# Patient Record
Sex: MALE | Race: WHITE | Employment: OTHER | URBAN - METROPOLITAN AREA
[De-identification: names, ages, dates, MRNs, and addresses within clinical notes are randomized per-mention and may not be internally consistent; named-entity substitution may affect disease eponyms.]

---

## 2020-01-13 ENCOUNTER — TELEPHONE (OUTPATIENT)
Dept: PULMONOLOGY | Facility: CLINIC | Age: 80
End: 2020-01-13

## 2020-01-13 NOTE — TELEPHONE ENCOUNTER
Evelyn Del Castillo states she spoke with Dr Zayra Vargas and was informed to call office to have patient's consult appointment moved up sooner. Please call. Thank you.

## 2020-01-14 NOTE — TELEPHONE ENCOUNTER
Patients daughter in law/Judy calling for appointment sooner than 4/16/2020, indicates Jah Rodriguez called her two days ago and approved appointment for this month in January. Please call at:[phone],thanks.

## 2020-01-14 NOTE — TELEPHONE ENCOUNTER
Disregard, found a new consult appointment with [facility] Thursday 1/16/2020 at 11:30 AM, informed to arrive . 15mins early, closing encounter, thanks.

## 2020-01-16 ENCOUNTER — TELEPHONE (OUTPATIENT)
Dept: PULMONOLOGY | Facility: CLINIC | Age: 80
End: 2020-01-16

## 2020-01-16 ENCOUNTER — OFFICE VISIT (OUTPATIENT)
Dept: PULMONOLOGY | Facility: CLINIC | Age: 80
End: 2020-01-16
Payer: MEDICARE

## 2020-01-16 VITALS
HEIGHT: 68 IN | HEART RATE: 78 BPM | OXYGEN SATURATION: 92 % | DIASTOLIC BLOOD PRESSURE: 70 MMHG | WEIGHT: 157 LBS | RESPIRATION RATE: 18 BRPM | SYSTOLIC BLOOD PRESSURE: 152 MMHG | BODY MASS INDEX: 23.79 KG/M2

## 2020-01-16 DIAGNOSIS — J84.10 PULMONARY FIBROSIS (HCC): Primary | ICD-10-CM

## 2020-01-16 PROCEDURE — G0463 HOSPITAL OUTPT CLINIC VISIT: HCPCS | Performed by: INTERNAL MEDICINE

## 2020-01-16 PROCEDURE — 99204 OFFICE O/P NEW MOD 45 MIN: CPT | Performed by: INTERNAL MEDICINE

## 2020-01-16 RX ORDER — PANTOPRAZOLE SODIUM 40 MG/1
40 TABLET, DELAYED RELEASE ORAL
COMMUNITY

## 2020-01-16 RX ORDER — BENZONATATE 100 MG/1
100 CAPSULE ORAL 3 TIMES DAILY PRN
Qty: 90 CAPSULE | Refills: 6 | Status: SHIPPED | OUTPATIENT
Start: 2020-01-16

## 2020-01-16 RX ORDER — OMEPRAZOLE 20 MG/1
20 CAPSULE, DELAYED RELEASE ORAL
COMMUNITY

## 2020-01-16 NOTE — PROGRESS NOTES
Dear Dr. Malgorzata Prince:           As you know, Mr. Chidi Burk is an 42-year-old male who I am now evaluating for second opinion regarding pulmonary fibrosis. HISTORY OF PRESENT ILLNESS: The patient is a lifetime non-smoker.   He had worked at the [employer] dry bibasilar crackles to auscultation and percussion. Cardiac regular rate and rhythm no murmur. Abdomen nontender, without hepatosplenomegaly and no mass appreciable. Extremities without clubbing cyanosis nor edema.  Neurologic grossly intact with symmetr

## 2020-01-16 NOTE — TELEPHONE ENCOUNTER
Daughter brought disc- CT abdomen/pelvis from [facility]. and PFT results- for Dr Ford Parra review- put in RN box-     Pt wants disc back- pl call when ready for

## 2020-01-21 ENCOUNTER — TELEPHONE (OUTPATIENT)
Dept: PULMONOLOGY | Facility: CLINIC | Age: 80
End: 2020-01-21

## 2020-01-21 DIAGNOSIS — J84.10 PULMONARY FIBROSIS (HCC): Primary | ICD-10-CM

## 2020-01-21 NOTE — TELEPHONE ENCOUNTER
Porsche Gonzales was wondering if Dr Prashant Lincoln had a chance to review CT Scan from [facility] and wanted to know what his thoughts were on with IPF. Please call. Thank you.

## 2020-01-29 NOTE — TELEPHONE ENCOUNTER
I spoke with the daughter-in-law regarding review of the CT scan of the abdomen. She was going to try to find a CT scan of the chest for my review. The abdominal films look like IPF.

## 2020-02-05 ENCOUNTER — TELEPHONE (OUTPATIENT)
Dept: PULMONOLOGY | Facility: CLINIC | Age: 80
End: 2020-02-05

## 2020-02-05 NOTE — TELEPHONE ENCOUNTER
Becka Garibay was wondering if Dr Idania Moulton had a chance to review CT Scan she has dropped off. Please call - aware Dr Idania Moulton is not in office. Thank you.

## 2020-02-05 NOTE — TELEPHONE ENCOUNTER
RN, please call [place] to let her know that I have received and reviewed the CAT scan and the findings are consistent with IPF.

## 2020-02-06 NOTE — TELEPHONE ENCOUNTER
I left a message stating my opinion that the appearance is consistent with IPF after reviewing the CAT scan.

## 2020-02-07 NOTE — TELEPHONE ENCOUNTER
Nicolasa Every informed of Dr. Sheree Manzo message below. Nicolasa Every would like to discuss with Dr. Sheree Manzo. Nicolasa Every would like to know if there is any other possible diagnoses. Nicolasa Every states pt did start Esbriet. Dr. Arpit Reyes, please call Nicolasa Every.

## 2020-02-10 NOTE — TELEPHONE ENCOUNTER
I spoke with Becka Garibay at telephone number [phone]. Again, I reiterated that I to strongly suspect the patient has IPF and agree with the empiric therapies as initiated.

## 2020-02-13 NOTE — TELEPHONE ENCOUNTER
2 copies of disc of CT Chest Hi-Resolution dated 12/24/19 from [facility] rcvd & brought to PACS w/ instructions to upload & mail back to pt thereafter per Dr. Blas Villela request.

## 2020-05-26 ENCOUNTER — TELEPHONE (OUTPATIENT)
Dept: PULMONOLOGY | Facility: CLINIC | Age: 80
End: 2020-05-26

## 2020-05-26 DIAGNOSIS — J84.10 PULMONARY FIBROSIS (HCC): Primary | ICD-10-CM

## 2020-05-26 NOTE — TELEPHONE ENCOUNTER
Pt's daughter states her dad is having test done at [facility] and wants advice from Dr Sonya Jean if these test are needed and if he would get the results before the appt on 6-2 at 2:30 pm. Please advise

## 2020-05-26 NOTE — TELEPHONE ENCOUNTER
Spoke with daughter Christophe Banda (ALONDRA on file) regarding message below. Daughter states primary pulmonologist at [facility] ordered CT chest, PFT, and thyroid lab test. Daughter states last CT chest was done 6 months ago and is currently on Esbriet.  Daughter

## 2020-05-26 NOTE — TELEPHONE ENCOUNTER
Daughter called office back again, inquiring if Dr. Samuel Hopkraig would order [employer] POC for patient, patient paying out of pocket. Advised daughter to call [employer] to see what documents are needed (O2 walk & office visit).  Daughter verbalized understanding and yeyo

## 2020-05-27 NOTE — TELEPHONE ENCOUNTER
Patients daughter/Judy calling to request to look out for a fax from [address] [place] [address] regarding approval needed for portable oxygen, please call at:[phone],thanks.

## 2020-05-27 NOTE — TELEPHONE ENCOUNTER
Daughter requesting order for portable oxygen concentrator. Patient using 2L of oxygen. Dr. Servando Narvaez to order [employer] Portable Oxygen Concentrator at pulse volume setting of 2 via nasal cannula? Patient has appointment scheduled on 6/2/20.

## 2020-05-28 ENCOUNTER — TELEPHONE (OUTPATIENT)
Dept: PULMONOLOGY | Facility: CLINIC | Age: 80
End: 2020-05-28

## 2020-05-28 NOTE — TELEPHONE ENCOUNTER
Incoming call from Fatmata Dotson at [place], confirmed order was received. Fatmata Dotson states order is under review and has been in contact with patient's daughter. POC form sent to HIM for scanning.

## 2020-05-28 NOTE — TELEPHONE ENCOUNTER
Received call from Mercy Hester at [place], states they will not accept our order. Form from [facility] placed in Dr. Kilo Alcantara folder to sign. Informed Mercy Hester once form is sign, will fax. Bessy verbalized understanding.

## 2020-05-28 NOTE — TELEPHONE ENCOUNTER
POC form faxed to [phone]. Fax confirmation received. Daughter informed of this and to follow up with [employer] regarding order. Daughter verbalized understanding.

## 2020-05-28 NOTE — TELEPHONE ENCOUNTER
Order faxed to [phone]. Fax confirmation received. Bessy from [place] informed of order.

## 2020-06-01 ENCOUNTER — TELEPHONE (OUTPATIENT)
Dept: PULMONOLOGY | Facility: CLINIC | Age: 80
End: 2020-06-01

## 2020-06-01 NOTE — TELEPHONE ENCOUNTER
Spoke with patient's daughter in law [place] (ALONDRA on file) regarding messasge below. [place] states patients appointment for tomorrow was rescheduled for 7/20/20 (due to covid meeting for MD).  Michael aguilar would like patient to be seen sooner, states patient has I

## 2020-06-02 ENCOUNTER — OFFICE VISIT (OUTPATIENT)
Dept: PULMONOLOGY | Facility: CLINIC | Age: 80
End: 2020-06-02
Payer: MEDICARE

## 2020-06-02 VITALS
RESPIRATION RATE: 20 BRPM | BODY MASS INDEX: 22 KG/M2 | DIASTOLIC BLOOD PRESSURE: 77 MMHG | WEIGHT: 145 LBS | OXYGEN SATURATION: 91 % | SYSTOLIC BLOOD PRESSURE: 136 MMHG | HEART RATE: 89 BPM

## 2020-06-02 DIAGNOSIS — J84.10 PULMONARY FIBROSIS (HCC): Primary | ICD-10-CM

## 2020-06-02 PROCEDURE — G0463 HOSPITAL OUTPT CLINIC VISIT: HCPCS | Performed by: INTERNAL MEDICINE

## 2020-06-02 PROCEDURE — 99213 OFFICE O/P EST LOW 20 MIN: CPT | Performed by: INTERNAL MEDICINE

## 2020-06-02 NOTE — PROGRESS NOTES
The patient is an 78-year-old male who I know well from prior evaluation comes in now for follow-up. He has been on Esbriet since Jan 21. She had a decrease in his dosing from 3 tablets daily down to 2 tablets daily.   He continues on pantoprazole for ALEKSANDR

## 2020-06-02 NOTE — TELEPHONE ENCOUNTER
Spoke to patient's grand daughter Sunitha Real. Informed of today's cancellations. Appointment scheduled for today 6/2/20 at 12:30PM. Travel screening complete. Verified time, location and where to park. Sunitha Real verbalized understanding.

## 2020-06-11 ENCOUNTER — TELEPHONE (OUTPATIENT)
Dept: PULMONOLOGY | Facility: CLINIC | Age: 80
End: 2020-06-11

## 2020-06-11 NOTE — TELEPHONE ENCOUNTER
Kaz requesting to speak with Dr César Ramos regarding his findings on CT Scan results. Please call. Thank you.

## 2020-06-15 RX ORDER — PREDNISONE 20 MG/1
TABLET ORAL
Qty: 10 TABLET | Refills: 0 | Status: SHIPPED | OUTPATIENT
Start: 2020-06-15

## 2020-06-15 NOTE — TELEPHONE ENCOUNTER
RN, please send in prescription for short course prednisone for this patient. I compared his CAT scan to prior studies and it is definitely worse. We will give an empiric trial of steroid for the possibility of nonspecific interstitial pneumonitis.   Adeola

## 2020-06-15 NOTE — TELEPHONE ENCOUNTER
Abundio Morin was going to compare pt's last CT in January to CT done @ [facility] in May. She stts MD has disc of CT.  Daughter-in-law stts pt may stop taking Esbriet depending on the results and PFT showed more interstitial scarring

## 2020-06-16 NOTE — TELEPHONE ENCOUNTER
Per Christophe Banda she spoke to Dr. Shahbaz Valiente last night re: results, MD sent prednisone script in which was already picked up, pt to take med x 1 wk, & f/u thereafter.

## 2020-06-22 ENCOUNTER — TELEPHONE (OUTPATIENT)
Dept: PULMONOLOGY | Facility: CLINIC | Age: 80
End: 2020-06-22

## 2020-06-22 NOTE — TELEPHONE ENCOUNTER
Patient's daughter called in to give an update on the patient after taking predniSONE 20 MG Oral Tab for several days.  Please advise

## 2020-06-23 RX ORDER — PREDNISONE 20 MG/1
20 TABLET ORAL DAILY
Qty: 30 TABLET | Refills: 2 | Status: SHIPPED | OUTPATIENT
Start: 2020-06-23

## 2020-06-23 NOTE — TELEPHONE ENCOUNTER
RN, please send in a prescription of prednisone 20 mg tablets #30 with 2 refills and have them call call the office in 2 weeks to give me an update.

## 2020-06-23 NOTE — TELEPHONE ENCOUNTER
Anjelica Juares pt was stumbling while walking, barely had any energy/did any activity, during bathroom trip his O2 sat dipped to 74%, & his O2 had to be bumped up to 3 L.  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

## 2020-06-24 NOTE — TELEPHONE ENCOUNTER
Shawnee Jalloh was informed of Dr. Means See orders below.  Explained script for 3 mo supply sent to pharmacy, MD is monitoring pt's response by requesting an update in 2 wks, maintenance doses of prednisone are prescribed, & benefits of medication may outweigh risks

## 2020-07-31 ENCOUNTER — TELEPHONE (OUTPATIENT)
Dept: PULMONOLOGY | Facility: CLINIC | Age: 80
End: 2020-07-31